# Patient Record
Sex: FEMALE | Race: WHITE | NOT HISPANIC OR LATINO | URBAN - METROPOLITAN AREA
[De-identification: names, ages, dates, MRNs, and addresses within clinical notes are randomized per-mention and may not be internally consistent; named-entity substitution may affect disease eponyms.]

---

## 2017-05-12 ENCOUNTER — IMPORTED ENCOUNTER (OUTPATIENT)
Dept: URBAN - METROPOLITAN AREA CLINIC 50 | Facility: CLINIC | Age: 67
End: 2017-05-12

## 2017-05-15 ENCOUNTER — IMPORTED ENCOUNTER (OUTPATIENT)
Dept: URBAN - METROPOLITAN AREA CLINIC 50 | Facility: CLINIC | Age: 67
End: 2017-05-15

## 2017-09-19 ENCOUNTER — IMPORTED ENCOUNTER (OUTPATIENT)
Dept: URBAN - METROPOLITAN AREA CLINIC 50 | Facility: CLINIC | Age: 67
End: 2017-09-19

## 2019-01-21 ENCOUNTER — IMPORTED ENCOUNTER (OUTPATIENT)
Dept: URBAN - METROPOLITAN AREA CLINIC 50 | Facility: CLINIC | Age: 69
End: 2019-01-21

## 2020-02-25 ENCOUNTER — IMPORTED ENCOUNTER (OUTPATIENT)
Dept: URBAN - METROPOLITAN AREA CLINIC 50 | Facility: CLINIC | Age: 70
End: 2020-02-25

## 2020-04-06 NOTE — PATIENT DISCUSSION
Conjunctivitis precautions explained to prevent risk of spread.  cold compresses.  maxitrol QID OU for 10 days.  says 's case is smoldering. .... ed if hers is as recalcitrant may need longer taper with steroid.

## 2020-04-20 NOTE — PATIENT DISCUSSION
4/20/2020: try longer taper with steroid - post viral an issue now.  try pred QID for one week and taper weekly.

## 2021-02-22 ENCOUNTER — IMPORTED ENCOUNTER (OUTPATIENT)
Dept: URBAN - METROPOLITAN AREA CLINIC 50 | Facility: CLINIC | Age: 71
End: 2021-02-22

## 2021-04-18 ASSESSMENT — VISUAL ACUITY
OS_CC: J1+
OD_CC: J1+
OD_SC: 20/30-
OS_CC: 20/20
OD_CC: J1+
OD_OTHER: 20/30-. 20/40-.
OD_CC: J1+
OS_BAT: 20/30-
OS_BAT: 20/70
OD_OTHER: 20/80. 20/100.
OS_OTHER: 20/30-. 20/40-.
OD_CC: 20/25+
OS_CC: J1+
OD_CC: 20/25+
OD_CC: J1+
OD_BAT: 20/80
OS_CC: 20/25+
OS_CC: 20/25
OS_CC: J1+
OD_BAT: 20/30-
OS_SC: 20/30
OD_CC: 20/25+
OD_CC: 20/20-1
OS_CC: J1+
OD_CC: J1+
OS_OTHER: 20/70. 20/80.
OS_CC: 20/25+
OS_CC: J1+

## 2021-04-18 ASSESSMENT — TONOMETRY
OD_IOP_MMHG: 14
OS_IOP_MMHG: 14
OS_IOP_MMHG: 14
OD_IOP_MMHG: 15
OS_IOP_MMHG: 14
OS_IOP_MMHG: 12
OD_IOP_MMHG: 14
OD_IOP_MMHG: 15
OD_IOP_MMHG: 14
OS_IOP_MMHG: 14